# Patient Record
Sex: MALE | Race: BLACK OR AFRICAN AMERICAN | NOT HISPANIC OR LATINO | URBAN - METROPOLITAN AREA
[De-identification: names, ages, dates, MRNs, and addresses within clinical notes are randomized per-mention and may not be internally consistent; named-entity substitution may affect disease eponyms.]

---

## 2021-09-16 ENCOUNTER — OUTPATIENT (OUTPATIENT)
Dept: OUTPATIENT SERVICES | Facility: HOSPITAL | Age: 31
LOS: 1 days | Discharge: HOME | End: 2021-09-16

## 2022-03-30 ENCOUNTER — OUTPATIENT (OUTPATIENT)
Dept: OUTPATIENT SERVICES | Facility: HOSPITAL | Age: 32
LOS: 1 days | Discharge: HOME | End: 2022-03-30

## 2022-03-31 DIAGNOSIS — K02.63 DENTAL CARIES ON SMOOTH SURFACE PENETRATING INTO PULP: ICD-10-CM

## 2022-04-13 ENCOUNTER — OUTPATIENT (OUTPATIENT)
Dept: OUTPATIENT SERVICES | Facility: HOSPITAL | Age: 32
LOS: 1 days | Discharge: HOME | End: 2022-04-13

## 2023-02-01 ENCOUNTER — APPOINTMENT (OUTPATIENT)
Dept: ORTHOPEDIC SURGERY | Facility: CLINIC | Age: 33
End: 2023-02-01
Payer: MEDICARE

## 2023-02-01 VITALS — BODY MASS INDEX: 19.76 KG/M2 | WEIGHT: 138 LBS | HEIGHT: 70 IN

## 2023-02-01 DIAGNOSIS — Z00.00 ENCOUNTER FOR GENERAL ADULT MEDICAL EXAMINATION W/OUT ABNORMAL FINDINGS: ICD-10-CM

## 2023-02-01 PROCEDURE — 99203 OFFICE O/P NEW LOW 30 MIN: CPT

## 2023-02-01 PROCEDURE — 73130 X-RAY EXAM OF HAND: CPT | Mod: LT

## 2023-02-01 RX ORDER — MELOXICAM 15 MG/1
15 TABLET ORAL DAILY
Qty: 30 | Refills: 2 | Status: ACTIVE | COMMUNITY
Start: 2023-02-01 | End: 1900-01-01

## 2023-02-01 NOTE — DISCUSSION/SUMMARY
[de-identified] : He is going to alternate ice and heat.\par I wrote him prescription for Mobic 15 mg 1 pill a day for the pain inflammation.\par I also gave him a prescription therapy for stretching, strengthening, range of motion, and if modalities help with the pain and inflammation.\par I am not able to reproduce any pain on physical exam, however the patient did 10 push-ups in the office and had elbow pain after the push-ups.\par He will refrain from physical activities including gymnastics.\par Follow-up in 4 to 6 weeks with Dr. Peres or Dr. Ramirez for repeat evaluation. All questions were answered today.

## 2023-02-01 NOTE — DATA REVIEWED
[FreeTextEntry1] : X-rays taken the office today of his left hand show no acute displaced fractures or bony normalities.

## 2023-02-01 NOTE — PHYSICAL EXAM
[de-identified] : Physical exam of his left elbow: Negative swelling or ecchymosis.  Nontender over the lateral or medial condyle.  Negative Lachman tenderness.  Forage motion of the elbow, wrist, and hand.  Sensory and motor intact.\par \par Physical exam of his left hand: Negative swelling or ecchymosis.  Nontender over the first through fifth metacarpals.  He can make a full fist.   strength out of 5 with mild pain.  Sensory and motor are intact.
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
## 2023-03-15 ENCOUNTER — APPOINTMENT (OUTPATIENT)
Dept: ORTHOPEDIC SURGERY | Facility: CLINIC | Age: 33
End: 2023-03-15
Payer: MEDICARE

## 2023-03-15 ENCOUNTER — APPOINTMENT (OUTPATIENT)
Dept: ORTHOPEDIC SURGERY | Facility: CLINIC | Age: 33
End: 2023-03-15

## 2023-03-15 DIAGNOSIS — M25.522 PAIN IN LEFT ELBOW: ICD-10-CM

## 2023-03-15 DIAGNOSIS — M79.642 PAIN IN LEFT HAND: ICD-10-CM

## 2023-03-15 PROCEDURE — 73080 X-RAY EXAM OF ELBOW: CPT | Mod: LT

## 2023-03-15 PROCEDURE — 99213 OFFICE O/P EST LOW 20 MIN: CPT

## 2023-03-15 NOTE — ASSESSMENT
[FreeTextEntry1] : Patient comes in with complaints of pain in his left elbow. 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 He has been go to therapy. The pain is improving due to therapy.  And does not bother him as much anymore.  He does not have much pain at the elbow.  Therapy is helping.\par \par full active range of motion of the left shoulder, wrist and fingers\par full active range of motion of the left elbow\par minimal Tenderness to palpation of the lateral epicondyle and proximal extensor supinator mass\par no pain with resisted wrist extension and forearm supination\par 5/5 strength in supination and wrist extension, otherwise 5/5 strength\par median/ulnar/radial sensation intact to light touch\par ain/pin/ulnar motor intact\par palpable pulses CR<2s\par \par X-rays are negative\par \par The patient was advised of the diagnosis. The natural history of the pathology was explained in full to the patient in layman's terms. All questions were answered. The risks and benefits of surgical and non-surgical treatment alternatives were explained in full to the patient. We discussed treatment options including nsaids, topical gels, tennis elbow strap, PT, activity modification, cortisone inj, sx .pt is aware that symptoms usually resolve on their own in 95-99% of people, but the timeframe is unknown.Home exercises/stretches were demonstrated and the patient practiced them as well- They are to do the exercises hourly and hold the stretch for 30 seconds. \par Avoid repetitive wrist flexion time was spent instructing the patient on appropriate placement of the elbow strap as well. \par \par Patient is doing well. He will continue to do what he is doing since it is helping him improve. He will return as needed.

## 2025-04-19 ENCOUNTER — OUTPATIENT (OUTPATIENT)
Dept: OUTPATIENT SERVICES | Facility: HOSPITAL | Age: 35
LOS: 1 days | End: 2025-04-19
Payer: MEDICARE

## 2025-04-19 DIAGNOSIS — Z00.8 ENCOUNTER FOR OTHER GENERAL EXAMINATION: ICD-10-CM

## 2025-04-19 DIAGNOSIS — R76.8 OTHER SPECIFIED ABNORMAL IMMUNOLOGICAL FINDINGS IN SERUM: ICD-10-CM

## 2025-04-19 PROCEDURE — 76700 US EXAM ABDOM COMPLETE: CPT | Mod: 26

## 2025-04-19 PROCEDURE — 76700 US EXAM ABDOM COMPLETE: CPT

## 2025-04-20 DIAGNOSIS — R76.8 OTHER SPECIFIED ABNORMAL IMMUNOLOGICAL FINDINGS IN SERUM: ICD-10-CM
